# Patient Record
Sex: MALE | Race: WHITE | NOT HISPANIC OR LATINO | URBAN - METROPOLITAN AREA
[De-identification: names, ages, dates, MRNs, and addresses within clinical notes are randomized per-mention and may not be internally consistent; named-entity substitution may affect disease eponyms.]

---

## 2018-06-07 ENCOUNTER — NEW PATIENT (OUTPATIENT)
Dept: URBAN - METROPOLITAN AREA CLINIC 44 | Facility: CLINIC | Age: 53
End: 2018-06-07
Payer: COMMERCIAL

## 2018-06-07 DIAGNOSIS — H25.13 AGE-RELATED NUCLEAR CATARACT, BILATERAL: ICD-10-CM

## 2018-06-07 DIAGNOSIS — H40.032 ANATOMICAL NARROW ANGLE, LEFT EYE: Primary | ICD-10-CM

## 2018-06-07 DIAGNOSIS — H40.031 ANATOMICAL NARROW ANGLE, RIGHT EYE: ICD-10-CM

## 2018-06-07 PROCEDURE — 76514 ECHO EXAM OF EYE THICKNESS: CPT | Performed by: OPHTHALMOLOGY

## 2018-06-07 PROCEDURE — 92004 COMPRE OPH EXAM NEW PT 1/>: CPT | Performed by: OPHTHALMOLOGY

## 2018-06-07 PROCEDURE — 92020 GONIOSCOPY: CPT | Performed by: OPHTHALMOLOGY

## 2018-06-07 ASSESSMENT — VISUAL ACUITY
OD: 20/20
OS: 20/20

## 2018-06-07 ASSESSMENT — KERATOMETRY
OD: 42.63
OS: 42.38

## 2018-08-06 ENCOUNTER — Encounter (OUTPATIENT)
Dept: URBAN - METROPOLITAN AREA CLINIC 10 | Facility: CLINIC | Age: 53
End: 2018-08-06
Payer: COMMERCIAL

## 2018-08-06 DIAGNOSIS — Z01.818 ENCOUNTER FOR OTHER PREPROCEDURAL EXAMINATION: Primary | ICD-10-CM

## 2018-08-06 PROCEDURE — 99213 OFFICE O/P EST LOW 20 MIN: CPT | Performed by: PHYSICIAN ASSISTANT

## 2018-08-06 RX ORDER — PREDNISOLONE ACETATE 10 MG/ML
1 % SUSPENSION/ DROPS OPHTHALMIC
Qty: 1 | Refills: 0 | Status: INACTIVE
Start: 2018-08-06 | End: 2018-08-15

## 2018-08-07 ENCOUNTER — SURGERY (OUTPATIENT)
Dept: URBAN - METROPOLITAN AREA SURGERY 5 | Facility: SURGERY | Age: 53
End: 2018-08-07
Payer: COMMERCIAL

## 2018-08-07 PROCEDURE — 66761 REVISION OF IRIS: CPT | Performed by: OPHTHALMOLOGY

## 2018-08-15 ENCOUNTER — FOLLOW UP ESTABLISHED (OUTPATIENT)
Dept: URBAN - METROPOLITAN AREA CLINIC 10 | Facility: CLINIC | Age: 53
End: 2018-08-15

## 2018-08-15 PROCEDURE — 99024 POSTOP FOLLOW-UP VISIT: CPT | Performed by: OPHTHALMOLOGY

## 2018-08-15 ASSESSMENT — INTRAOCULAR PRESSURE
OD: 13
OD: 12
OS: 13
OS: 12

## 2022-12-28 ENCOUNTER — OFFICE VISIT (OUTPATIENT)
Dept: URBAN - METROPOLITAN AREA CLINIC 44 | Facility: CLINIC | Age: 57
End: 2022-12-28
Payer: COMMERCIAL

## 2022-12-28 DIAGNOSIS — H40.033 ANATOMICAL NARROW ANGLE, BILATERAL: Primary | ICD-10-CM

## 2022-12-28 PROCEDURE — 92083 EXTENDED VISUAL FIELD XM: CPT | Performed by: OPHTHALMOLOGY

## 2022-12-28 PROCEDURE — 76514 ECHO EXAM OF EYE THICKNESS: CPT | Performed by: OPHTHALMOLOGY

## 2022-12-28 PROCEDURE — 92020 GONIOSCOPY: CPT | Performed by: OPHTHALMOLOGY

## 2022-12-28 PROCEDURE — 92250 FUNDUS PHOTOGRAPHY W/I&R: CPT | Performed by: OPHTHALMOLOGY

## 2022-12-28 PROCEDURE — 92133 CPTRZD OPH DX IMG PST SGM ON: CPT | Performed by: OPHTHALMOLOGY

## 2022-12-28 PROCEDURE — 99204 OFFICE O/P NEW MOD 45 MIN: CPT | Performed by: OPHTHALMOLOGY

## 2022-12-28 RX ORDER — PREDNISOLONE ACETATE 10 MG/ML
1 % SUSPENSION/ DROPS OPHTHALMIC
Qty: 5 | Refills: 0 | Status: ACTIVE
Start: 2022-12-28

## 2022-12-28 ASSESSMENT — INTRAOCULAR PRESSURE
OD: 20
OS: 19

## 2022-12-28 NOTE — IMPRESSION/PLAN
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 Plan: PT IS AT RISK FOR  NAG  OU      GONIO GRADE: OD /OS SS (12/28/2022) PACHS: [id number]/[id number] PREVIOUSLY TREATED BY DR Dorado Phi
S/P LPI OS 08/07/2022 PT DENIES SULFA ALLERGY
PT DENIES LUNG DZ
TARGET IOP IS LOW TEENS OR LESS OU 
RECOMMEND 1. S/P LPI OS 08/07/2022 2. RECOMMEND LPI OD TO AVOID ANGLE CLOSURE. DISCUSSED RISKS VS BENEFITS OF LPI. PT IS AWARE THAT THERE IS A 20% CHANCE THAT THEY MAY REQUIRE ENLARGEMENT OF THE LPI TO CREATE AN IDEAL SIZED IRIDOTOMY. PT MAY  EXPERIENCE AN INFERIOR FLOATER FOLLOWING THE LPI PROCEDURE. THE PT IS AWARE THAT IF ANGLE CLOSURE OCCURS, THEY MAY EXPERIENCE  SIGNIFICANT VISION LOSS AND POSSIBLY COMPLETE LOSS OF ALL SIGHT IN THE AFFECTED EYE. 3. PT WISHES TO PROCEED WITH LPI OD 12/28/2022 4. ON 12/28/2022 THE PT WAS GIVEN WRITTEN LITERATURE ON THE NATURE OF ANGLE CLOSURE 5. THE PT IS AWARE THAT THEY MAY REQUIRE LONG-TERM USE OF EYEDROP MEDICATIONS EVEN AFTER SUCCESSFUL LPI 6.  SCHEDULE LPI OD

## 2022-12-30 ENCOUNTER — SURGERY (OUTPATIENT)
Dept: URBAN - METROPOLITAN AREA SURGERY 19 | Facility: SURGERY | Age: 57
End: 2022-12-30
Payer: COMMERCIAL

## 2022-12-30 PROCEDURE — 66761 REVISION OF IRIS: CPT | Performed by: OPHTHALMOLOGY

## 2023-01-25 ENCOUNTER — OFFICE VISIT (OUTPATIENT)
Dept: URBAN - METROPOLITAN AREA CLINIC 44 | Facility: CLINIC | Age: 58
End: 2023-01-25
Payer: COMMERCIAL

## 2023-01-25 DIAGNOSIS — H40.033 ANATOMICAL NARROW ANGLE, BILATERAL: Primary | ICD-10-CM

## 2023-01-25 PROCEDURE — 92020 GONIOSCOPY: CPT | Performed by: OPHTHALMOLOGY

## 2023-01-25 PROCEDURE — 99213 OFFICE O/P EST LOW 20 MIN: CPT | Performed by: OPHTHALMOLOGY

## 2023-01-25 RX ORDER — PREDNISOLONE ACETATE 10 MG/ML
1 % SUSPENSION/ DROPS OPHTHALMIC
Qty: 5 | Refills: 0 | Status: INACTIVE
Start: 2023-01-25 | End: 2023-01-25

## 2023-01-25 ASSESSMENT — INTRAOCULAR PRESSURE
OS: 16
OD: 15

## 2023-01-25 NOTE — IMPRESSION/PLAN
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 Plan: PT IS AT RISK FOR  NAG  OU      GONIO GRADE: OD /OS SS (12/28/2022) PACHS: [id number] PREVIOUSLY TREATED BY DR Aislnin Gaines
S/P LPI OS 08/07/2022 PT DENIES SULFA ALLERGY
PT DENIES LUNG DZ
TARGET IOP IS LOW TEENS OR LESS OU 
RECOMMEND 1. S/P LPI OS 08/07/2022, S/P LPI OD 12/30/22 2. ON 12/28/2022 THE PT WAS GIVEN WRITTEN LITERATURE ON THE NATURE OF ANGLE CLOSURE
3. 12/28/22 VFT, [facility], P[place]s, RNFL
4. F/U 12-18 MONTHS/PRN WITH VF AND [employer]

## 2024-01-26 ENCOUNTER — OFFICE VISIT (OUTPATIENT)
Dept: URBAN - METROPOLITAN AREA CLINIC 44 | Facility: CLINIC | Age: 59
End: 2024-01-26
Payer: COMMERCIAL

## 2024-01-26 DIAGNOSIS — H40.033 ANATOMICAL NARROW ANGLE, BILATERAL: Primary | ICD-10-CM

## 2024-01-26 PROCEDURE — 92083 EXTENDED VISUAL FIELD XM: CPT | Performed by: OPHTHALMOLOGY

## 2024-01-26 PROCEDURE — 99214 OFFICE O/P EST MOD 30 MIN: CPT | Performed by: OPHTHALMOLOGY

## 2024-01-26 PROCEDURE — 92020 GONIOSCOPY: CPT | Performed by: OPHTHALMOLOGY

## 2024-01-26 ASSESSMENT — INTRAOCULAR PRESSURE
OS: 14
OD: 14